# Patient Record
Sex: FEMALE | Race: WHITE | Employment: FULL TIME | URBAN - NONMETROPOLITAN AREA
[De-identification: names, ages, dates, MRNs, and addresses within clinical notes are randomized per-mention and may not be internally consistent; named-entity substitution may affect disease eponyms.]

---

## 2019-05-29 ENCOUNTER — OFFICE VISIT (OUTPATIENT)
Dept: PHYSICAL MEDICINE AND REHAB | Age: 49
End: 2019-05-29
Payer: COMMERCIAL

## 2019-05-29 VITALS
BODY MASS INDEX: 32.22 KG/M2 | WEIGHT: 193.4 LBS | DIASTOLIC BLOOD PRESSURE: 69 MMHG | HEART RATE: 77 BPM | HEIGHT: 65 IN | SYSTOLIC BLOOD PRESSURE: 128 MMHG

## 2019-05-29 DIAGNOSIS — G89.4 CHRONIC PAIN SYNDROME: ICD-10-CM

## 2019-05-29 DIAGNOSIS — F41.9 ANXIETY: ICD-10-CM

## 2019-05-29 DIAGNOSIS — M48.061 SPINAL STENOSIS OF LUMBAR REGION WITHOUT NEUROGENIC CLAUDICATION: ICD-10-CM

## 2019-05-29 DIAGNOSIS — M47.816 LUMBAR SPONDYLOSIS: Primary | ICD-10-CM

## 2019-05-29 PROCEDURE — 99244 OFF/OP CNSLTJ NEW/EST MOD 40: CPT | Performed by: PAIN MEDICINE

## 2019-05-29 RX ORDER — NAPROXEN SODIUM 220 MG
220 TABLET ORAL 2 TIMES DAILY WITH MEALS
COMMUNITY

## 2019-05-29 SDOH — HEALTH STABILITY: MENTAL HEALTH: HOW OFTEN DO YOU HAVE A DRINK CONTAINING ALCOHOL?: NEVER

## 2019-05-29 ASSESSMENT — ENCOUNTER SYMPTOMS
VOMITING: 0
ABDOMINAL PAIN: 0
EYE PAIN: 0
RHINORRHEA: 0
SORE THROAT: 0
COUGH: 0
COLOR CHANGE: 0
BOWEL INCONTINENCE: 0
CHEST TIGHTNESS: 0
BACK PAIN: 1
DIARRHEA: 0
CONSTIPATION: 0
SHORTNESS OF BREATH: 0
NAUSEA: 0
SINUS PRESSURE: 0
WHEEZING: 0
PHOTOPHOBIA: 0

## 2019-05-29 NOTE — PROGRESS NOTES
[address] [facility]  [address] [facility] [address].  Dept: [phone]  Dept Fax: [phone]: [phone]    Visit Date: 5/29/2019    Enoch Fernandez is a 52 y.o. female who is referred for pain management evaluation and treatment per Dr. Ami Eagle. CAGE and CAGE-A[employer] Questions   1. In the last three months, have you felt you should cut down or stop drinking or using drugs? Yes []        No [x]     2. In the last three months, has anyone annoyed you or gotten on your nerves by telling you to cut down or stop drinking or using drugs? Yes []        No [x]     3. In the last three months, have you felt guilty or bad about how much you drink or use drugs? Yes []        No [x]     4. In the last three months, have you been waking up wanting to have an alcoholic drink or use drugs? Yes []        No [x]        Opioid Risk Tool:  Clinician Form       1. Family History of Substance Abuse: Female Male    Alcohol   []1   []3    Illegal drugs   []2   []3    Prescription drugs     []4   []4   2. Personal History of Substance Abuse:          Alcohol   []3   []3    Illegal drugs   []4   []4    Prescription drugs     []5   []5   3. Age (priscilla box if between 12 and 39):     []1   []1   4. History of Preadolescent Sexual Abuse:     []3   []0   5. Psychological Disease:      Attention deficit disorder, obsessive-compulsive disorder, bipolar, schizophrenia   []2   []2      Depression     []1   []1    Scoring Totals 0 0     Total Score  Low Risk  Moderate Risk  High Risk   Risk Category   0 - 3   4 - 7   8 or Above      Patient states symptoms interfere with:  A.  General Activity:  yes  B. Mood: yes   C. Walking Ability:   yes   D. Normal Work (Includes both work outside the home and housework):yes  E.  Relations with Other People:  yes  F. Sleep:   yes  G.  Enjoyment of Life:  yes      HPI:     ChiefComplaint: Low back pain    Back Pain   Chronicity: Patient is a 51 y/o female comes with lower back pain for last several years. States works at [employer] for last 5-6 years . Denies injuries to lower back. The problem occurs constantly. The problem has been gradually worsening since onset. The pain is present in the lumbar spine. The quality of the pain is described as aching, burning, shooting and stabbing. The pain does not radiate. Pain scale: Pain scale at worse is a 9/10 and at best is a 6/10, The pain is worse during the day. The symptoms are aggravated by bending, position and twisting (walking. States pain better with sitting with support. ). Pertinent negatives include no abdominal pain, bladder incontinence, bowel incontinence, chest pain, fever, headaches, leg pain, numbness, perianal numbness, tingling or weakness. She has tried NSAIDs, muscle relaxant and chiropractic manipulation (PT) for the symptoms. The treatment provided no relief. MRI LUMBAR SPINE 04/06/2018:  Multilevel spondylosis ,severity most pronounced L2-L3. There is moderate/severe bilateral stenosis. The patient has No Known Allergies. PastMedical History  Kenyatta Dobbs  has a past medical history of Scoliosis. Past Surgical History  The patient  has a past surgical history that includes audrey and bso (cervix removed). Family History  This patient's family history includes Cancer in her father; Elevated Lipids in her mother. Social History  Kenyatta Dobbs  reports that she has been smoking. She has never used smokeless tobacco. She reports that she does not drink alcohol or use drugs. Medications    Current Outpatient Medications:     naproxen sodium (ALEVE) 220 MG tablet, Take 220 mg by mouth 2 times daily (with meals), Disp: , Rfl:     Subjective:      Review of Systems   Constitutional: Positive for activity change. Negative for appetite change, chills, diaphoresis, fatigue, fever and unexpected weight change.    HENT: Negative for congestion, ear pain, hearing loss, mouth sores, nosebleeds, rhinorrhea, sinus pressure and sore throat. Eyes: Negative for photophobia, pain and visual disturbance. Respiratory: Negative for cough, chest tightness, shortness of breath and wheezing. Cardiovascular: Negative for chest pain and palpitations. Gastrointestinal: Negative for abdominal pain, bowel incontinence, constipation, diarrhea, nausea and vomiting. Endocrine: Negative for cold intolerance, heat intolerance, polydipsia, polyphagia and polyuria. Genitourinary: Negative for bladder incontinence, decreased urine volume, difficulty urinating, frequency and hematuria. Musculoskeletal: Positive for arthralgias, back pain, gait problem and neck pain. Negative for joint swelling, myalgias and neck stiffness. Skin: Negative for color change and rash. Allergic/Immunologic: Negative for food allergies and immunocompromised state. Neurological: Negative for dizziness, tingling, tremors, seizures, syncope, facial asymmetry, speech difficulty, weakness, light-headedness, numbness and headaches. Hematological: Does not bruise/bleed easily. Psychiatric/Behavioral: Positive for sleep disturbance. Negative for agitation, behavioral problems, confusion, decreased concentration, dysphoric mood, hallucinations, self-injury and suicidal ideas. The patient is nervous/anxious. The patient is not hyperactive. Objective:     Vitals:    05/29/19 0838   BP: 128/69   Site: Right Upper Arm   Position: Sitting   Cuff Size: Medium Adult   Pulse: 77   Weight: 193 lb 6.4 oz (87.7 kg)   Height: 5' 5\" (1.651 m)       Physical Exam   Constitutional: She is oriented to person, place, and time. She appears well-developed and well-nourished. No distress. HENT:   Head: Normocephalic and atraumatic. Right Ear: External ear normal.   Left Ear: External ear normal.   Nose: Nose normal.   Mouth/Throat: Oropharynx is clear and moist. No oropharyngeal exudate.    Eyes: Pupils are equal, round, and reactive to light. Conjunctivae and EOM are normal. Right eye exhibits no discharge. Left eye exhibits no discharge. No scleral icterus. Neck: Normal range of motion and full passive range of motion without pain. Neck supple. No muscular tenderness present. No neck rigidity. No edema, no erythema and normal range of motion present. No thyromegaly present. Cardiovascular: Normal rate, regular rhythm, normal heart sounds and intact distal pulses. Exam reveals no gallop and no friction rub. No murmur heard. Pulmonary/Chest: Effort normal and breath sounds normal. No respiratory distress. She has no wheezes. She has no rales. She exhibits no tenderness. Abdominal: Soft. Bowel sounds are normal. She exhibits no distension. There is no tenderness. There is no rebound and no guarding. Musculoskeletal:        Right shoulder: She exhibits normal range of motion and no tenderness. Left shoulder: She exhibits normal range of motion and no tenderness. Right hip: She exhibits no tenderness. Left hip: She exhibits no tenderness. Right knee: She exhibits normal range of motion and no swelling. Left knee: She exhibits normal range of motion and no swelling. Right ankle: She exhibits normal range of motion and no swelling. Left ankle: She exhibits normal range of motion and no swelling. Cervical back: She exhibits normal range of motion, no tenderness, no pain and no spasm. Thoracic back: She exhibits tenderness. Lumbar back: She exhibits decreased range of motion, tenderness, pain and spasm. Back:         Right lower leg: She exhibits no swelling. Left lower leg: She exhibits no swelling. Right foot: There is no swelling. Left foot: There is no swelling. Neurological: She is alert and oriented to person, place, and time. She has normal strength and normal reflexes. She is not disoriented.  She displays no atrophy. No cranial nerve deficit or sensory deficit. She exhibits normal muscle tone. She displays a negative Romberg sign. Gait abnormal. Coordination normal. She displays no Babinski's sign on the right side. She displays no Babinski's sign on the left side. GAIT-SLOW UNASSISTED  SLR MILD POSITIVE RLE   Skin: Skin is warm. No rash noted. She is not diaphoretic. No erythema. No pallor. Psychiatric: Her speech is normal and behavior is normal. Judgment and thought content normal. Her mood appears not anxious. Her affect is not angry, not blunt, not labile and not inappropriate. She is not agitated, not aggressive, not hyperactive, not slowed, not withdrawn, not actively hallucinating and not combative. Thought content is not paranoid and not delusional. Cognition and memory are normal. Cognition and memory are not impaired. She does not express impulsivity or inappropriate judgment. She does not exhibit a depressed mood. She expresses no homicidal and no suicidal ideation. She expresses no suicidal plans and no homicidal plans. She exhibits normal recent memory and normal remote memory. She is attentive. Nursing note and vitals reviewed. DELTA test: neg  Yeoman's test: neg  Gaenslen test: neg     Assessment:     1. Lumbar spondylosis    2. Spinal stenosis of lumbar region without neurogenic claudication    3. Chronic pain syndrome    4. Anxiety            Plan:      · Patient read and signed orientation and opioid agreement. · OARRS reviewed. Current MED: 0  · Patient was not offered naloxone for home. · Discussed long term side effects of medications, tolerance, dependency and addiction. · UDS preformed today. · Patient told can not receive any pain medications from any other source. · No evidence of abuse, diversion or aberrant behavior.   · Prescription Needs: Await clean UDS for future prescription needs   Medications and/or procedures to improve function and quality of life- patient understanding with this and that may not be pain free   Discussed possible weaning of medication dosing dependent on treatment/procedure results.  Discussed with patient about safe storage of medications at home   Testing: Reviewed MRI Lumbar spine and XR with patient.  Procedures: Bilateral L-facet MBB at L3-4,L4-5 and L5-S1 for diagnostic purpose.  Discussed with patient about risks with procedure including infection, reaction to medication, increased pain, or bleeding.  Medications: Continue with NSAIDS.  Patient to remain OFF work until procedure preformed. Previous Treatments tried:  · PT: Yes,  any benefit? No, how many weeks? 4, last date done: this yr  · NSAIDs: Yes,  any benefit? Yes,  how long taken: takes  · Chiropractic: Yes,  any benefit? No  · Muscle relaxants: Yes,  any benefit? No  · Narcotics: No,  any benefit? No  · Spine surgeon consult: No  · Any Implants: No    Meds. Prescribed:   No orders of the defined types were placed in this encounter. Return for Bilateral L-facet MBB @ L3-4,L4-5 and L5-S1 . Time spent with patient was 60 minutes more than 50% was spent  Counseling/coordinated the patient'scare.     Electronically signed by Audelia Mcguire MD on 5/29/2019 at 8:12 PM

## 2019-05-29 NOTE — LETTER
[id number] [place] [address].  Phone: [phone]  Fax: [phone]    Ying Stapleton MD        June 23, 2019       Patient: Ciera Caldwell   MR Number: [id number]   YOB: 1970   Date of Visit: 5/29/2019       Dear Dr. Fredo Cha: Thank you for the request for consultation for Minnie Durant to me for the evaluation of back pain. Below are the relevant portions of my assessment and plan of care. If you have questions, please do not hesitate to call me. I look forward to following Yarely Vargas along with you.     Sincerely,        Fred Sanabria MD    CC providers:  MD Lalito Adame .  [place] [phone]  VIA Mail

## 2019-06-11 ENCOUNTER — PREP FOR PROCEDURE (OUTPATIENT)
Dept: PHYSICAL MEDICINE AND REHAB | Age: 49
End: 2019-06-11

## 2019-06-20 ENCOUNTER — TELEPHONE (OUTPATIENT)
Dept: PHYSICAL MEDICINE AND REHAB | Age: 49
End: 2019-06-20

## 2019-06-20 NOTE — TELEPHONE ENCOUNTER
Pt. Called and left message that she was cancelling procedure for tommorow at 11:45 d/t a death in the family.